# Patient Record
Sex: MALE | Race: WHITE | NOT HISPANIC OR LATINO | Employment: OTHER | URBAN - NONMETROPOLITAN AREA
[De-identification: names, ages, dates, MRNs, and addresses within clinical notes are randomized per-mention and may not be internally consistent; named-entity substitution may affect disease eponyms.]

---

## 2018-10-10 ENCOUNTER — APPOINTMENT (OUTPATIENT)
Dept: GENERAL RADIOLOGY | Facility: HOSPITAL | Age: 43
End: 2018-10-10

## 2018-10-10 ENCOUNTER — HOSPITAL ENCOUNTER (EMERGENCY)
Facility: HOSPITAL | Age: 43
Discharge: HOME | End: 2018-10-10
Attending: EMERGENCY MEDICINE | Admitting: EMERGENCY MEDICINE

## 2018-10-10 VITALS
OXYGEN SATURATION: 99 % | HEART RATE: 86 BPM | DIASTOLIC BLOOD PRESSURE: 75 MMHG | BODY MASS INDEX: 27.02 KG/M2 | WEIGHT: 193 LBS | RESPIRATION RATE: 15 BRPM | TEMPERATURE: 98.4 F | SYSTOLIC BLOOD PRESSURE: 124 MMHG | HEIGHT: 71 IN

## 2018-10-10 DIAGNOSIS — S82.892A CLOSED AVULSION FRACTURE OF LEFT ANKLE, INITIAL ENCOUNTER: Primary | ICD-10-CM

## 2018-10-10 PROCEDURE — 25010000002 KETOROLAC TROMETHAMINE PER 15 MG: Performed by: EMERGENCY MEDICINE

## 2018-10-10 PROCEDURE — 73610 X-RAY EXAM OF ANKLE: CPT

## 2018-10-10 PROCEDURE — 99283 EMERGENCY DEPT VISIT LOW MDM: CPT

## 2018-10-10 PROCEDURE — 96372 THER/PROPH/DIAG INJ SC/IM: CPT

## 2018-10-10 RX ORDER — KETOROLAC TROMETHAMINE 30 MG/ML
60 INJECTION, SOLUTION INTRAMUSCULAR; INTRAVENOUS ONCE
Status: COMPLETED | OUTPATIENT
Start: 2018-10-10 | End: 2018-10-10

## 2018-10-10 RX ORDER — HYDROCODONE BITARTRATE AND ACETAMINOPHEN 5; 325 MG/1; MG/1
1 TABLET ORAL EVERY 6 HOURS PRN
Qty: 10 TABLET | Refills: 0 | OUTPATIENT
Start: 2018-10-10 | End: 2020-05-01

## 2018-10-10 RX ADMIN — KETOROLAC TROMETHAMINE 60 MG: 60 INJECTION, SOLUTION INTRAMUSCULAR at 16:31

## 2018-10-10 NOTE — ED PROVIDER NOTES
Subjective   43 years old male presented in the ER with left ankle swelling and pain since morning.  Patient was getting out of the house, missed one step and bent his left ankle.  Since then he's having increasing pain and swelling especially on the lateral malleolus.  Patient describes this as a sharp shooting pain moderate to severe intensity, aggravated with ambulation, partially relieved with holding still and elevation, associated with increasing swelling.  No numbness tingling of the toes/foot.  No pain in the foot.  Injury anywhere else.        History provided by:  Patient      Review of Systems   Constitutional: Negative for chills and fever.   HENT: Negative for congestion.    Respiratory: Negative for chest tightness and shortness of breath.    Cardiovascular: Negative for chest pain.   Genitourinary: Negative for flank pain.   Musculoskeletal: Positive for gait problem and joint swelling.   Skin: Negative for color change.   Neurological: Negative for syncope and light-headedness.   Psychiatric/Behavioral: Negative for agitation.       History reviewed. No pertinent past medical history.    No Known Allergies    History reviewed. No pertinent surgical history.    History reviewed. No pertinent family history.    Social History     Social History   • Marital status: Single     Social History Main Topics   • Smoking status: Current Every Day Smoker     Packs/day: 1.00     Types: Cigarettes   • Alcohol use Yes      Comment: 3-4 beers daily   • Drug use: Yes     Types: Marijuana      Comment: occasional     Other Topics Concern   • Not on file           Objective   Physical Exam   Constitutional: He is oriented to person, place, and time. He appears well-developed and well-nourished.   HENT:   Head: Normocephalic and atraumatic.   Nose: Nose normal.   Mouth/Throat: Oropharynx is clear and moist.   Eyes: Conjunctivae are normal.   Neck: Normal range of motion. Neck supple.   Cardiovascular: Normal rate,  regular rhythm and normal heart sounds.    Pulmonary/Chest: Effort normal and breath sounds normal.   Musculoskeletal: He exhibits tenderness.        Left ankle: He exhibits swelling. Tenderness. Lateral malleolus tenderness found. No head of 5th metatarsal tenderness found.        Neurological: He is alert and oriented to person, place, and time.   Skin: Skin is warm and dry. Capillary refill takes less than 2 seconds.   Psychiatric: He has a normal mood and affect.   Nursing note and vitals reviewed.      Procedures           ED Course                  MDM  Number of Diagnoses or Management Options  Diagnosis management comments: Will give Toradol for pain,  He has avulsion fracture of the tip of lateral malleolus.  We'll place him in a boot, crutches and have him follow-up with ortho for evaluation       Amount and/or Complexity of Data Reviewed  Tests in the radiology section of CPT®: ordered and reviewed      Labs Reviewed - No data to display    Xr Ankle 3+ View Left    Result Date: 10/10/2018  Narrative: Procedure:  Left ankle.    Indication:  Trauma, pain   . Technique:  Three views   . Prior relevant exam:  None. There is lateral soft tissue swelling. There is a tiny bony density beneath the lateral malleolus. This represent a small chip, avulsion type injury. Left ankle is otherwise unremarkable.     Impression: CONCLUSION: Lateral soft tissue swelling. Tiny bony density beneath the lateral malleolus possibly a small avulsion, chip type injury. Otherwise unremarkable left ankle. Electronically signed by:  Thang Jean MD  10/10/2018 3:53 PM CDT Workstation: [id number]          Final diagnoses:   Closed avulsion fracture of left ankle, initial encounter            Leonel Giordano MD  10/10/18 0943

## 2018-10-10 NOTE — DISCHARGE INSTRUCTIONS
Take pain medications as needed.  No weightbearing.  Follow up with podiatry for reevaluation.  Return to ER for worsening.

## 2018-10-15 ENCOUNTER — OFFICE VISIT (OUTPATIENT)
Dept: PODIATRY | Facility: CLINIC | Age: 43
End: 2018-10-15

## 2018-10-15 VITALS
SYSTOLIC BLOOD PRESSURE: 115 MMHG | BODY MASS INDEX: 27.64 KG/M2 | OXYGEN SATURATION: 96 % | HEIGHT: 71 IN | HEART RATE: 83 BPM | WEIGHT: 197.4 LBS | DIASTOLIC BLOOD PRESSURE: 83 MMHG

## 2018-10-15 DIAGNOSIS — S93.402A SPRAIN OF LEFT ANKLE, UNSPECIFIED LIGAMENT, INITIAL ENCOUNTER: Primary | ICD-10-CM

## 2018-10-15 PROCEDURE — 99203 OFFICE O/P NEW LOW 30 MIN: CPT | Performed by: PODIATRIST

## 2018-10-15 RX ORDER — HYDROCODONE BITARTRATE AND ACETAMINOPHEN 7.5; 325 MG/1; MG/1
1 TABLET ORAL EVERY 8 HOURS PRN
Qty: 21 TABLET | Refills: 0 | OUTPATIENT
Start: 2018-10-15 | End: 2020-05-01

## 2018-10-15 NOTE — PROGRESS NOTES
Mathew Sam  1975  43 y.o. male     Patient presents today with complaint of left ankle pain after injury. He was seen at [facility] ER and was referred here. He states his pain is 2/10 today.     10/15/2018    Chief Complaint   Patient presents with   • Left Ankle - Pain       History of Present Illness    Mathew Sam is a 43 y.o.male who presents to clinic with chief complaint of left ankle pain following injury.  Patient states that last Wednesday he twisted his ankle walking down stairs in the dark.  He states that his left ankle became very swollen and bruised.  He did go to the emergency department where x-rays were taken.  Questionable fibular avulsion fracture was noted.  He was placed into a cam boot and given follow-up with podiatry.  He presents today for follow-up.  He is currently ambulating in a cam boot.  He rates his pain as a 2 out of 10.  He does state that his foot hurts worse at night and is having trouble sleeping.  He has no other pedal complaints.      History reviewed. No pertinent past medical history.      History reviewed. No pertinent surgical history.      Family History   Problem Relation Age of Onset   • Cancer Mother    • Heart disease Mother        No Known Allergies    Social History     Social History   • Marital status: Single     Spouse name: N/A   • Number of children: N/A   • Years of education: N/A     Occupational History   • Not on file.     Social History Main Topics   • Smoking status: Current Every Day Smoker     Packs/day: 1.00     Types: Cigarettes   • Smokeless tobacco: Not on file   • Alcohol use Yes      Comment: 3-4 beers daily   • Drug use: Yes     Types: Marijuana      Comment: occasional   • Sexual activity: Not on file     Other Topics Concern   • Not on file     Social History Narrative   • No narrative on file         Current Outpatient Prescriptions   Medication Sig Dispense Refill   • diclofenac (VOLTAREN) 50 MG EC tablet Take 1 tablet by  "mouth 2 (Two) Times a Day. 60 tablet 0   • HYDROcodone-acetaminophen (NORCO) 5-325 MG per tablet Take 1 tablet by mouth Every 6 (Six) Hours As Needed for Moderate Pain  for up to 10 doses. 10 tablet 0   • HYDROcodone-acetaminophen (NORCO) 7.5-325 MG per tablet Take 1 tablet by mouth Every 8 (Eight) Hours As Needed for Moderate Pain . 21 tablet 0     No current facility-administered medications for this visit.        Review of Systems   Constitutional: Negative.    HENT: Negative.    Eyes: Negative.    Respiratory: Negative.    Cardiovascular: Negative.    Gastrointestinal: Negative.    Musculoskeletal:        Left ankle pain and swelling   Skin: Negative.    Psychiatric/Behavioral: Negative.          OBJECTIVE    /83   Pulse 83   Ht 180.3 cm (70.98\")   Wt 89.5 kg (197 lb 6.4 oz)   SpO2 96%   BMI 27.54 kg/m²       Physical Exam   Constitutional: He is oriented to person, place, and time. He appears well-developed and well-nourished.   HENT:   Head: Normocephalic and atraumatic.   Nose: Nose normal.   Eyes: Pupils are equal, round, and reactive to light. Conjunctivae and EOM are normal.   Pulmonary/Chest: Effort normal. No respiratory distress. He has no wheezes.   Neurological: He is alert and oriented to person, place, and time. He displays normal reflexes.   Skin: He is not diaphoretic.   Psychiatric: He has a normal mood and affect. His behavior is normal.   Vitals reviewed.      Gait: antalgic    Assistive Device: cam boot    Left Lower Extremity    Cardiovascular:    DP/PT pulses palpable    CFT brisk  to all digits  Skin temp is warm to warm from proximal tibia to distal digits      Musculoskeletal:  Muscle strength deferred  ROM of the 1st MTP is full without pain or crepitus   Negative pain with anterior drawer test  Negative pain with talar tilt test  POP to ATFL and CFL  Ankle ROM WNL    Dermatological:   Skin is warm, dry and intact   Webspaces 1-4 are clean, dry and intact.   No subcutaneous " nodules or masses noted    No open wounds noted     Neurological:   Protective sensation intact   Sensation intact to light touch        Procedures        ASSESSMENT AND PLAN    Mathew was seen today for pain.    Diagnoses and all orders for this visit:    Sprain of left ankle, unspecified ligament, initial encounter    Other orders  -     diclofenac (VOLTAREN) 50 MG EC tablet; Take 1 tablet by mouth 2 (Two) Times a Day.  -     HYDROcodone-acetaminophen (NORCO) 7.5-325 MG per tablet; Take 1 tablet by mouth Every 8 (Eight) Hours As Needed for Moderate Pain .      - Comprehensive foot and ankle exam performed.   - X-rays reviewed taken in the emergency department  - WBAT in cam boot only for now. Dispensed tubagrip stocking   - Ice and elevation at home  - Rx for diclofenac and Norco 7.5  - All questions were answered to the patients satisfaction.  - RTC 2 weeks            This document has been electronically signed by Carson Altman DPM on October 15, 2018 12:44 PM     10/15/2018  12:44 PM

## 2018-10-29 ENCOUNTER — OFFICE VISIT (OUTPATIENT)
Dept: PODIATRY | Facility: CLINIC | Age: 43
End: 2018-10-29

## 2018-10-29 VITALS — OXYGEN SATURATION: 98 % | WEIGHT: 197.31 LBS | HEART RATE: 96 BPM | BODY MASS INDEX: 27.62 KG/M2 | HEIGHT: 71 IN

## 2018-10-29 DIAGNOSIS — S93.402D SPRAIN OF LEFT ANKLE, UNSPECIFIED LIGAMENT, SUBSEQUENT ENCOUNTER: Primary | ICD-10-CM

## 2018-10-29 PROCEDURE — 99213 OFFICE O/P EST LOW 20 MIN: CPT | Performed by: PODIATRIST

## 2018-10-29 NOTE — PATIENT INSTRUCTIONS
Ankle Sprain, Phase II Rehab  Ask your health care provider which exercises are safe for you. Do exercises exactly as told by your health care provider and adjust them as directed. It is normal to feel mild stretching, pulling, tightness, or discomfort as you do these exercises, but you should stop right away if you feel sudden pain or your pain gets worse. Do not begin these exercises until told by your health care provider.  Stretching and range of motion exercises  These exercises warm up your muscles and joints and improve the movement and flexibility of your lower leg and ankle. These exercises also help to relieve pain and stiffness.  Exercise A: Gastroc stretch, standing    1. Stand with your hands against a wall.  2. Extend your left / right leg behind you, and bend your front knee slightly. Your heels should be on the floor.  3. Keeping your heels on the floor and your back knee straight, shift your weight toward the wall. You should feel a gentle stretch in the back of your lower leg (calf).  4. Hold this position for __________ seconds.  Repeat __________ times. Complete this exercise __________ times a day.  Exercise B: Soleus stretch, standing  1. Stand with your hands against a wall.  2. Extend your left / right leg behind you, and bend your front knee slightly. Both of your heels should be on the floor.  3. Keeping your heels on the floor, bend your back knee and shift your weight slightly over your back leg. You should feel a gentle stretch deep in your calf.  4. Hold this position for __________ seconds.  Repeat __________ times. Complete this exercise __________ times a day.  Strengthening exercises  These exercises build strength and endurance in your lower leg. Endurance is the ability to use your muscles for a long time, even after they get tired.  Exercise C: Heel walking (  dorsiflexion)  Walk on your heels for __________ seconds or ___________ ft. Keep your toes as high as possible.  Repeat  __________ times. Complete this exercise __________ times a day.  Balance exercises  These exercises improve your balance and the reaction and control of your ankle to help improve stability.  Exercise D: Multi-angle lunge  1. Stand with your feet together.  2. Take a step forward with your left / right leg, and shift your weight onto that leg. Your back heel will come off the floor, and your back toes will stay in place.  3. Push off your front leg to return your front foot to the starting position next to your other foot.  4. Repeat to the side, to the back, and any other directions as told by your health care provider.  Repeat in each direction __________ times. Complete this exercise __________ times a day.  Exercise E: Single leg stand  1. Without shoes, stand near a railing or in a door frame. Hold onto the railing or door frame as needed.  2. Stand on your left / right foot. Keep your big toe down on the floor and try to keep your arch lifted.  3. Hold this position for __________ seconds.  Repeat __________ times. Complete this exercise __________ times a day.  If this exercise is too easy, you can try it with your eyes closed or while standing on a pillow.  Exercise F: Inversion/eversion    You will need a balance board for this exercise. Ask your health care provider where you can get a balance board or how you can make one.  1. Stand on a non-carpeted surface near a countertop or wall.  2. Step onto the balance board so your feet are hip-width apart.  3. Keep your feet in place and keep your upper body and hips steady. Using only your feet and ankles to move the board, do one or both of the following exercises as told by your health care provider:  ? Tip the board side to side as far as you can, alternating between tipping to the left and tipping to the right. If you can, tip the board so it silently taps the floor. Do not let the board forcefully hit the floor. From time to time, pause to hold a steady  position.  ? Tip the board side to side so the board does not hit the floor at all. From time to time, pause to hold a steady position.  Repeat the movement for each exercise __________ times. Complete each exercise __________ times a day.  Exercise G: Plantar flexion/dorsiflexion    You will need a balance board for this exercise. Ask your health care provider where you can get a balance board or how you can make one.  1. Stand on a non-carpeted surface near a countertop or wall.  2. Step onto the balance board so your feet are hip-width apart.  3. Keep your feet in place and keep your upper body and hips steady. Using only your feet and ankles to move the board, do one or both of the following exercises as told by your health care provider:  ? Tip the board forward and backward so the board silently taps the floor. Do not let the board forcefully hit the floor. From time to time, pause to hold a steady position.  ? Tip the board forward and backward so the board does not hit the floor at all. From time to time, pause to hold a steady position.  Repeat the movement for each exercise __________ times. Complete each exercise __________ times a day.  This information is not intended to replace advice given to you by your health care provider. Make sure you discuss any questions you have with your health care provider.  Document Released: 04/09/2007 Document Revised: 08/24/2017 Document Reviewed: 10/31/2016  [employer] Interactive Patient Education © 2018 [employer]  Ankle Sprain, Phase I Rehab  Ask your health care provider which exercises are safe for you. Do exercises exactly as told by your health care provider and adjust them as directed. It is normal to feel mild stretching, pulling, tightness, or discomfort as you do these exercises, but you should stop right away if you feel sudden pain or your pain gets worse. Do not begin these exercises until told by your health care provider.  Stretching and range of  motion exercises  These exercises warm up your muscles and joints and improve the movement and flexibility of your lower leg and ankle. These exercises also help to relieve pain and stiffness.  Exercise A: Gastroc and soleus stretch    1. Sit on the floor with your left / right leg extended.  2. Loop a belt or towel around the ball of your left / right foot. The ball of your foot is on the walking surface, right under your toes.  3. Keep your left / right ankle and foot relaxed and keep your knee straight while you use the belt or towel to pull your foot toward you. You should feel a gentle stretch behind your calf or knee.  4. Hold this position for __________ seconds, then release to the starting position.  Repeat the exercise with your knee bent. You can put a pillow or a rolled bath towel under your knee to support it. You should feel a stretch deep in your calf or at your Achilles tendon.  Repeat each stretch __________ times. Complete these stretches __________ times a day.  Exercise B: Ankle alphabet    1. Sit with your left / right leg supported at the lower leg.  ? Do not rest your foot on anything.  ? Make sure your foot has room to move freely.  2. Think of your left / right foot as a paintbrush, and move your foot to trace each letter of the alphabet in the air. Keep your hip and knee still while you trace. Make the letters as large as you can without feeling discomfort.  3. Trace every letter from A to Z.  Repeat __________ times. Complete this exercise __________ times a day.  Strengthening exercises  These exercises build strength and endurance in your ankle and lower leg. Endurance is the ability to use your muscles for a long time, even after they get tired.  Exercise C: Dorsiflexors    1. Secure a rubber exercise band or tube to an object, such as a table leg, that will stay still when the band is pulled. Secure the other end around your left / right foot.  2. Sit on the floor facing the object,  with your left / right leg extended. The band or tube should be slightly tense when your foot is relaxed.  3. Slowly bring your foot toward you, pulling the band tighter.  4. Hold this position for __________ seconds.  5. Slowly return your foot to the starting position.  Repeat __________ times. Complete this exercise __________ times a day.  Exercise D: Plantar flexors    1. Sit on the floor with your left / right leg extended.  2. Loop a rubber exercise tube or band around the ball of your left / right foot. The ball of your foot is on the walking surface, right under your toes.  ? Hold the ends of the band or tube in your hands.  ? The band or tube should be slightly tense when your foot is relaxed.  3. Slowly point your foot and toes downward, pushing them away from you.  4. Hold this position for __________ seconds.  5. Slowly return your foot to the starting position.  Repeat __________ times. Complete this exercise __________ times a day.  Exercise E: Evertors  1. Sit on the floor with your legs straight out in front of you.  2. Loop a rubber exercise band or tube around the ball of your left / right foot. The ball of your foot is on the walking surface, right under your toes.  ? Hold the ends of the band in your hands, or secure the band to a stable object.  ? The band or tube should be slightly tense when your foot is relaxed.  3. Slowly push your foot outward, away from your other leg.  4. Hold this position for __________ seconds.  5. Slowly return your foot to the starting position.  Repeat __________ times. Complete this exercise __________ times a day.  This information is not intended to replace advice given to you by your health care provider. Make sure you discuss any questions you have with your health care provider.  Document Released: 07/19/2006 Document Revised: 08/24/2017 Document Reviewed: 10/31/2016  [employer] Interactive Patient Education © 2018 [employer]

## 2018-10-29 NOTE — PROGRESS NOTES
Mathew Angulo Sam  1975  43 y.o. male     Patient presents today for follow up of left ankle pain after an injury. He states he is still having a lot of pain in the ankle and it still stays swollen some. He gave a pain scale of 3/10 today.         Chief Complaint   Patient presents with   • Left Ankle - Follow-up       History of Present Illness    Patient presents to clinic today for follow-up of his left ankle sprain.  He is currently weightbearing as tolerated in a cam boot.  He states that his pain is improving.  His swelling has also decreased.  He currently rates his pain as a 3 out of 10.  He denies any new pedal complaints.      History reviewed. No pertinent past medical history.      History reviewed. No pertinent surgical history.      Family History   Problem Relation Age of Onset   • Cancer Mother    • Heart disease Mother        No Known Allergies    Social History     Social History   • Marital status: Single     Spouse name: N/A   • Number of children: N/A   • Years of education: N/A     Occupational History   • Not on file.     Social History Main Topics   • Smoking status: Current Every Day Smoker     Packs/day: 1.00     Types: Cigarettes   • Smokeless tobacco: Not on file   • Alcohol use Yes      Comment: 3-4 beers daily   • Drug use: Yes     Types: Marijuana      Comment: occasional   • Sexual activity: Not on file     Other Topics Concern   • Not on file     Social History Narrative   • No narrative on file         Current Outpatient Prescriptions   Medication Sig Dispense Refill   • diclofenac (VOLTAREN) 50 MG EC tablet Take 1 tablet by mouth 2 (Two) Times a Day. 60 tablet 1   • HYDROcodone-acetaminophen (NORCO) 5-325 MG per tablet Take 1 tablet by mouth Every 6 (Six) Hours As Needed for Moderate Pain  for up to 10 doses. 10 tablet 0   • HYDROcodone-acetaminophen (NORCO) 7.5-325 MG per tablet Take 1 tablet by mouth Every 8 (Eight) Hours As Needed for Moderate Pain . 21 tablet 0     No  "current facility-administered medications for this visit.        Review of Systems   Constitutional: Negative.    HENT: Negative.    Eyes: Negative.    Cardiovascular: Negative.    Gastrointestinal: Negative.    Musculoskeletal:        Left ankle pain and swelling   Skin: Negative.    Psychiatric/Behavioral: Negative.          OBJECTIVE    Pulse 96   Ht 180.3 cm (70.98\")   Wt 89.5 kg (197 lb 5 oz)   SpO2 98%   BMI 27.53 kg/m²       Physical Exam   Constitutional: He is oriented to person, place, and time. He appears well-developed and well-nourished.   HENT:   Head: Normocephalic and atraumatic.   Nose: Nose normal.   Eyes: Pupils are equal, round, and reactive to light. Conjunctivae and EOM are normal.   Pulmonary/Chest: Effort normal. No respiratory distress. He has no wheezes.   Neurological: He is alert and oriented to person, place, and time. He displays normal reflexes.   Skin: He is not diaphoretic.   Psychiatric: He has a normal mood and affect. His behavior is normal.       Gait: antalgic    Assistive Device: cam boot    Left Lower Extremity    Cardiovascular:    DP/PT pulses palpable    CFT brisk  to all digits  Skin temp is warm to warm from proximal tibia to distal digits      Musculoskeletal:  Muscle strength normal  ROM of the 1st MTP is full without pain or crepitus   Negative pain with anterior drawer test  Negative pain with talar tilt test  POP to ATFL and CFL  Ankle ROM WNL    Dermatological:   Skin is warm, dry and intact   Webspaces 1-4 are clean, dry and intact.   No subcutaneous nodules or masses noted    No open wounds noted     Neurological:   Protective sensation intact   Sensation intact to light touch        Procedures        ASSESSMENT AND PLAN    Mathew was seen today for follow-up.    Diagnoses and all orders for this visit:    Sprain of left ankle, unspecified ligament, subsequent encounter  -     XR Ankle 3+ View Left    Other orders  -     diclofenac (VOLTAREN) 50 MG EC tablet; " Take 1 tablet by mouth 2 (Two) Times a Day.      - Patient is improving  -X-rays taken and reviewed  - Refilled anti-inflammatory medication  - Dispensed lace up ankle brace and therapy band  - Patient to start at home physical therapy exercises   - All questions were answered to the patients satisfaction.  - RTC 2 weeks            This document has been electronically signed by Carson Altman DPM on October 29, 2018 12:36 PM     10/29/2018  12:36 PM

## 2018-11-05 ENCOUNTER — PREP FOR SURGERY (OUTPATIENT)
Dept: OTHER | Facility: HOSPITAL | Age: 43
End: 2018-11-05

## 2020-05-01 PROCEDURE — U0002 COVID-19 LAB TEST NON-CDC: HCPCS | Performed by: FAMILY MEDICINE

## 2020-11-23 NOTE — ED TRIAGE NOTES
Pt missed a step this morning around 0530 this morning and injured his left ankle. Swelling noted to lateral aspect of left ankle. Ice applied at triage.   Vocal cord dysfunction

## 2021-06-05 ENCOUNTER — HOSPITAL ENCOUNTER (EMERGENCY)
Facility: HOSPITAL | Age: 46
Discharge: HOME | End: 2021-06-05
Attending: EMERGENCY MEDICINE | Admitting: EMERGENCY MEDICINE

## 2021-06-05 VITALS
HEART RATE: 123 BPM | RESPIRATION RATE: 20 BRPM | SYSTOLIC BLOOD PRESSURE: 126 MMHG | BODY MASS INDEX: 26.6 KG/M2 | TEMPERATURE: 97 F | DIASTOLIC BLOOD PRESSURE: 96 MMHG | WEIGHT: 190 LBS | HEIGHT: 71 IN | OXYGEN SATURATION: 95 %

## 2021-06-05 DIAGNOSIS — IMO0001 ALCOHOLISM /ALCOHOL ABUSE: Primary | ICD-10-CM

## 2021-06-05 PROCEDURE — 93005 ELECTROCARDIOGRAM TRACING: CPT | Performed by: EMERGENCY MEDICINE

## 2021-06-05 PROCEDURE — 93010 ELECTROCARDIOGRAM REPORT: CPT | Performed by: INTERNAL MEDICINE

## 2021-06-05 PROCEDURE — 99282 EMERGENCY DEPT VISIT SF MDM: CPT

## 2021-06-05 RX ORDER — CHLORDIAZEPOXIDE HYDROCHLORIDE 25 MG/1
25 CAPSULE, GELATIN COATED ORAL 4 TIMES DAILY PRN
Qty: 16 CAPSULE | Refills: 0 | OUTPATIENT
Start: 2021-06-05 | End: 2021-06-22

## 2021-06-06 LAB
QT INTERVAL: 312 MS
QTC INTERVAL: 425 MS

## 2021-06-06 NOTE — ED NOTES
Patient denies consuming any alcohol today, but does state that he had a pint of santos yesterday.     Brenda Barton, RN  06/05/21 2183

## 2021-06-06 NOTE — ED PROVIDER NOTES
Subjective   Patient presents emergency department with complaint of substance abuse though not today.  Patient states that he has a problem with alcoholism.  Patient states he is quit several times who normally drinks every day.  He states he quit as far as 72 hours even this week.  Patient does get some shakes though has not had any seizures or significant DTs per his account.  Patient does not appear intoxicated at this time.  Patient states he just would like his daughter in his life again, and this is not possible with his current drinking.  Patient is looking at his options in terms of rehab.          Review of Systems   Constitutional: Negative.  Negative for appetite change, chills and fever.   HENT: Negative.  Negative for congestion.    Eyes: Negative.  Negative for photophobia and visual disturbance.   Respiratory: Negative.  Negative for cough, chest tightness and shortness of breath.    Cardiovascular: Negative.  Negative for chest pain and palpitations.   Gastrointestinal: Negative.  Negative for abdominal pain, constipation, diarrhea, nausea and vomiting.   Endocrine: Negative.    Genitourinary: Negative.  Negative for decreased urine volume, dysuria, flank pain and hematuria.   Musculoskeletal: Negative.  Negative for arthralgias, back pain, myalgias, neck pain and neck stiffness.   Skin: Negative.  Negative for pallor.   Neurological: Negative.  Negative for dizziness, syncope, weakness, light-headedness, numbness and headaches.   Psychiatric/Behavioral: Negative.  Negative for agitation, behavioral problems, confusion, decreased concentration, dysphoric mood, hallucinations and suicidal ideas. The patient is not nervous/anxious and is not hyperactive.    All other systems reviewed and are negative.      History reviewed. No pertinent past medical history.    No Known Allergies    History reviewed. No pertinent surgical history.    Family History   Problem Relation Age of Onset   • Cancer Mother     • Heart disease Mother        Social History     Socioeconomic History   • Marital status: Single     Spouse name: Not on file   • Number of children: Not on file   • Years of education: Not on file   • Highest education level: Not on file   Tobacco Use   • Smoking status: Current Every Day Smoker     Packs/day: 1.00     Types: Cigarettes   Substance and Sexual Activity   • Alcohol use: Yes     Comment: 3-4 beers daily   • Drug use: Yes     Types: Marijuana     Comment: occasional           Objective   Physical Exam  Vitals and nursing note reviewed.   Constitutional:       General: He is not in acute distress.     Appearance: Normal appearance. He is well-developed. He is not ill-appearing or toxic-appearing.   HENT:      Head: Normocephalic and atraumatic.      Mouth/Throat:      Mouth: Mucous membranes are moist.   Eyes:      Extraocular Movements: Extraocular movements intact.      Conjunctiva/sclera: Conjunctivae normal.      Pupils: Pupils are equal, round, and reactive to light.   Neck:      Vascular: No JVD.   Cardiovascular:      Rate and Rhythm: Regular rhythm. Tachycardia present.      Heart sounds: Normal heart sounds. No murmur heard.   No friction rub. No gallop.    Pulmonary:      Effort: Pulmonary effort is normal. No respiratory distress.      Breath sounds: No wheezing or rales.   Chest:      Chest wall: No tenderness.   Abdominal:      General: Bowel sounds are normal. There is no distension.      Palpations: Abdomen is soft. There is no mass.      Tenderness: There is no abdominal tenderness. There is no guarding or rebound.   Musculoskeletal:         General: Normal range of motion.      Cervical back: Normal range of motion and neck supple.   Skin:     General: Skin is warm and dry.      Capillary Refill: Capillary refill takes less than 2 seconds.   Neurological:      General: No focal deficit present.      Mental Status: He is alert and oriented to person, place, and time.   Psychiatric:          Behavior: Behavior normal.         Thought Content: Thought content normal.         Judgment: Judgment normal.         Procedures           ED Course  ED Course as of Jun 05 2228   Sat Jun 05, 2021 2154 Patient was seen and evaluated by our , Sakshi, who gave him counseling in the local resources for substance abuse.  Patient will call these resources and will be given a Librium course to help with his withdrawal symptoms.  Patient will be discharged outpatient follow-up.    [PC]   2154 EKG notes sinus tachycardia.  Regular rhythm.    [PC]      ED Course User Index  [PC] Flavio Kunz MD                                           [facility]    Final diagnoses:   Alcoholism /alcohol abuse (CMS/HCC)       ED Disposition  ED Disposition     ED Disposition Condition Comment    Discharge Stable           [facility]  [address] [place] [address] [phone]  Call   For further evaluation and management         Medication List      New Prescriptions    chlordiazePOXIDE 25 MG capsule  Commonly known as: LIBRIUM  Take 1 capsule by mouth 4 (Four) Times a Day As Needed for Anxiety or Withdrawal.           Where to Get Your Medications      These medications were sent to [facility] [place] AT [place] [address] - [phone]  - [phone] [address] [place], [facility] [address]    Phone: [phone]   · chlordiazePOXIDE 25 MG capsule          Flavio Kunz MD  06/05/21 8071

## 2021-06-06 NOTE — DISCHARGE INSTRUCTIONS
Use the alcohol/substance abuse packet resources for further help.  Start librium to help with withdrawal symptoms.  Do not drink alcohol with the librium as this can cause death.  Return with any new or worsening symptoms, or any concerns.

## 2021-06-06 NOTE — CASE MANAGEMENT/SOCIAL WORK
"Pt is here for rehab information per the er track board. I took pt detox/rehab packet to er room 15. He asked for his \"wife\" to be contacted upstairs to come and secure his personal belongings. His ex-wife Wali had already called and donny/issac Matthew RN er clinical leader that her ex  is in the er and she will come to er if he needs her for anything. I called her at the request of pt and she will come get his belongings. I d/w Tiff HERRERA  "

## 2021-11-12 DIAGNOSIS — M25.511 ACUTE PAIN OF RIGHT SHOULDER: Primary | ICD-10-CM

## 2021-11-15 ENCOUNTER — OFFICE VISIT (OUTPATIENT)
Dept: ORTHOPEDIC SURGERY | Facility: CLINIC | Age: 46
End: 2021-11-15

## 2021-11-15 VITALS — WEIGHT: 192 LBS | HEIGHT: 71 IN | BODY MASS INDEX: 26.88 KG/M2

## 2021-11-15 DIAGNOSIS — M25.511 CHRONIC RIGHT SHOULDER PAIN: Primary | ICD-10-CM

## 2021-11-15 DIAGNOSIS — M75.41 IMPINGEMENT SYNDROME OF RIGHT SHOULDER: ICD-10-CM

## 2021-11-15 DIAGNOSIS — G89.29 CHRONIC RIGHT SHOULDER PAIN: Primary | ICD-10-CM

## 2021-11-15 DIAGNOSIS — M67.911 ROTATOR CUFF DYSFUNCTION, RIGHT: ICD-10-CM

## 2021-11-15 PROCEDURE — 20610 DRAIN/INJ JOINT/BURSA W/O US: CPT | Performed by: NURSE PRACTITIONER

## 2021-11-15 PROCEDURE — 99214 OFFICE O/P EST MOD 30 MIN: CPT | Performed by: NURSE PRACTITIONER

## 2021-11-15 RX ORDER — LIDOCAINE HYDROCHLORIDE 10 MG/ML
2 INJECTION, SOLUTION INFILTRATION; PERINEURAL
Status: COMPLETED | OUTPATIENT
Start: 2021-11-15 | End: 2021-11-15

## 2021-11-15 RX ORDER — MELOXICAM 15 MG/1
15 TABLET ORAL DAILY
Qty: 30 TABLET | Refills: 1 | Status: SHIPPED | OUTPATIENT
Start: 2021-11-15 | End: 2022-03-22 | Stop reason: SDUPTHER

## 2021-11-15 RX ORDER — TRIAMCINOLONE ACETONIDE 40 MG/ML
40 INJECTION, SUSPENSION INTRA-ARTICULAR; INTRAMUSCULAR
Status: COMPLETED | OUTPATIENT
Start: 2021-11-15 | End: 2021-11-15

## 2021-11-15 RX ADMIN — LIDOCAINE HYDROCHLORIDE 2 ML: 10 INJECTION, SOLUTION INFILTRATION; PERINEURAL at 09:12

## 2021-11-15 RX ADMIN — TRIAMCINOLONE ACETONIDE 40 MG: 40 INJECTION, SUSPENSION INTRA-ARTICULAR; INTRAMUSCULAR at 09:12

## 2021-11-15 NOTE — PROGRESS NOTES
Mathew Sam is a 46 y.o. male   Primary provider:  Provider, No Known       Chief Complaint   Patient presents with   • Right Shoulder - Pain       HISTORY OF PRESENT ILLNESS:    Patient is a 46-year-old male who presents today with complaints of right shoulder pain that has been present for the past 4 to 5 years.  Recently he has noticed pain in his bicep and in his neck as well.  He reports the pain is moderate and constant.  Pain is stabbing and aching in quality.  Pain is associated with clicking and popping.  Overhead activity is particularly painful.  Patient states he is relying on his neck muscles to help him get things out of cabinets.  He has tried ice and heat and rest without significant relief in symptoms.  He is sent for x-rays today.    Pain  This is a chronic problem. The current episode started more than 1 year ago. The problem occurs constantly. The problem has been gradually worsening. Associated symptoms include arthralgias, myalgias and numbness. The symptoms are aggravated by exertion. He has tried ice, heat and rest for the symptoms.        CONCURRENT MEDICAL HISTORY:    History reviewed. No pertinent past medical history.    No Known Allergies      Current Outpatient Medications:   •  meloxicam (Mobic) 15 MG tablet, Take 1 tablet by mouth Daily., Disp: 30 tablet, Rfl: 1    No past surgical history on file.    Family History   Problem Relation Age of Onset   • Cancer Mother    • Heart disease Mother         Social History     Socioeconomic History   • Marital status:    Tobacco Use   • Smoking status: Current Every Day Smoker     Packs/day: 1.00     Types: Cigarettes   Substance and Sexual Activity   • Alcohol use: Yes     Comment: 3-4 beers daily   • Drug use: Yes     Types: Marijuana     Comment: occasional        Review of Systems   Constitutional: Negative.    HENT: Negative.    Eyes: Negative.    Respiratory: Negative.    Cardiovascular: Negative.    Gastrointestinal:  "Negative.    Endocrine: Negative.    Genitourinary: Negative.    Musculoskeletal: Positive for arthralgias and myalgias.        Right shoulder pain   Skin: Negative.    Allergic/Immunologic: Negative.    Neurological: Positive for numbness.        Tingling   Hematological: Negative.    Psychiatric/Behavioral: Positive for sleep disturbance.       PHYSICAL EXAMINATION:       Ht 180.3 cm (71\")   Wt 87.1 kg (192 lb)   BMI 26.78 kg/m²     Physical Exam  Vitals and nursing note reviewed.   Constitutional:       General: He is not in acute distress.     Appearance: He is well-developed. He is not toxic-appearing.   HENT:      Head: Normocephalic.   Pulmonary:      Effort: Pulmonary effort is normal. No respiratory distress.   Skin:     General: Skin is warm and dry.   Neurological:      Mental Status: He is alert and oriented to person, place, and time.   Psychiatric:         Behavior: Behavior normal.         Thought Content: Thought content normal.         Judgment: Judgment normal.         GAIT:     []  Normal  []  Antalgic    Assistive device: []  None  []  Walker     []  Crutches  []  Cane     []  Wheelchair  []  Stretcher    Left Shoulder Exam     Tenderness   The patient is experiencing tenderness in the acromion and acromioclavicular joint.    Range of Motion   Active abduction: 110 (Pain 70)   Extension: normal   External rotation: 80   Forward flexion: 140 (Pain at 90)   Internal rotation 90 degrees: 90     Tests   Wang test: positive  Cross arm: negative  Impingement: positive    Other   Erythema: absent  Sensation: normal  Pulse: present     Comments:  Positive full can test  Positive empty can test  Negative liftoff test              XR Shoulder 2+ View Right    Result Date: 11/15/2021  Narrative: Study: XR shoulder 2+ view, right Comparison: None Narrative: Glenohumeral joint space is well-maintained.  Mild AC joint arthrosis.  Alignment and position of right shoulder are acceptable.  No evidence of " fracture or dislocation.  Stacy Padilla APRN 11/15/2021           ASSESSMENT:    Diagnoses and all orders for this visit:    Chronic right shoulder pain  -     Ambulatory Referral to Physical Therapy Evaluate and treat; (as indicated ); Stretching, ROM, Strengthening    Rotator cuff dysfunction, right    Impingement syndrome of right shoulder    Other orders  -     meloxicam (Mobic) 15 MG tablet; Take 1 tablet by mouth Daily.  -     Cancel: Large Joint Arthrocentesis  -     Large Joint Arthrocentesis: R subacromial bursa          PLAN    Large Joint Arthrocentesis: R subacromial bursa  Date/Time: 11/15/2021 9:12 AM  Consent given by: patient  Site marked: site marked  Timeout: Immediately prior to procedure a time out was called to verify the correct patient, procedure, equipment, support staff and site/side marked as required   Supporting Documentation  Indications: pain   Procedure Details  Location: shoulder - R subacromial bursa  Preparation: Patient was prepped and draped in the usual sterile fashion  Needle size: 22 G  Approach: posterior  Medications administered: 40 mg triamcinolone acetonide 40 MG/ML; 2 mL lidocaine 1 %  Patient tolerance: patient tolerated the procedure well with no immediate complications          Pain elicited with special testing of rotator cuff and with range of motion.  Discussed treatment of rotator cuff tendinitis and impingement syndrome.  After discussing risk, benefits, alternatives patient desires to proceed with subacromial injection, Mobic, physical therapy for 1-2 visits.  [facility] ordered sent to [facility].  Patient tolerated injection well.  Patient started on Mobic.  Patient to return in 4 to 6 weeks for recheck, if not feeling significantly better plan for MRI.      Return in about 4 weeks (around 12/13/2021), or if symptoms worsen or fail to improve.    Stacy Padilla, APRN

## 2022-03-22 ENCOUNTER — LAB (OUTPATIENT)
Dept: LAB | Facility: HOSPITAL | Age: 47
End: 2022-03-22

## 2022-03-22 ENCOUNTER — OFFICE VISIT (OUTPATIENT)
Dept: FAMILY MEDICINE CLINIC | Facility: CLINIC | Age: 47
End: 2022-03-22

## 2022-03-22 VITALS
TEMPERATURE: 96.9 F | SYSTOLIC BLOOD PRESSURE: 132 MMHG | RESPIRATION RATE: 20 BRPM | BODY MASS INDEX: 27.15 KG/M2 | HEIGHT: 71 IN | OXYGEN SATURATION: 97 % | HEART RATE: 94 BPM | DIASTOLIC BLOOD PRESSURE: 82 MMHG | WEIGHT: 193.9 LBS

## 2022-03-22 DIAGNOSIS — K21.9 GASTROESOPHAGEAL REFLUX DISEASE WITHOUT ESOPHAGITIS: ICD-10-CM

## 2022-03-22 DIAGNOSIS — Z11.59 NEED FOR HEPATITIS C SCREENING TEST: ICD-10-CM

## 2022-03-22 DIAGNOSIS — E55.9 VITAMIN D DEFICIENCY: ICD-10-CM

## 2022-03-22 DIAGNOSIS — Z76.89 ENCOUNTER TO ESTABLISH CARE: Primary | ICD-10-CM

## 2022-03-22 DIAGNOSIS — Z00.00 ANNUAL PHYSICAL EXAM: ICD-10-CM

## 2022-03-22 DIAGNOSIS — F33.0 MILD EPISODE OF RECURRENT MAJOR DEPRESSIVE DISORDER: ICD-10-CM

## 2022-03-22 DIAGNOSIS — Z12.5 PROSTATE CANCER SCREENING: ICD-10-CM

## 2022-03-22 DIAGNOSIS — E78.2 MIXED HYPERLIPIDEMIA: ICD-10-CM

## 2022-03-22 DIAGNOSIS — R20.0 NUMBNESS OF LEFT THUMB: ICD-10-CM

## 2022-03-22 DIAGNOSIS — M75.41 IMPINGEMENT SYNDROME OF RIGHT SHOULDER: ICD-10-CM

## 2022-03-22 DIAGNOSIS — F41.9 ANXIETY: ICD-10-CM

## 2022-03-22 DIAGNOSIS — G56.02 COMPRESSION OF LEFT MEDIAN NERVE AT ELBOW: ICD-10-CM

## 2022-03-22 LAB
25(OH)D3 SERPL-MCNC: 18.1 NG/ML (ref 30–100)
ALBUMIN SERPL-MCNC: 5 G/DL (ref 3.5–5.2)
ALBUMIN/GLOB SERPL: 1.9 G/DL
ALP SERPL-CCNC: 59 U/L (ref 39–117)
ALT SERPL W P-5'-P-CCNC: 17 U/L (ref 1–41)
ANION GAP SERPL CALCULATED.3IONS-SCNC: 9.6 MMOL/L (ref 5–15)
AST SERPL-CCNC: 12 U/L (ref 1–40)
BASOPHILS # BLD AUTO: 0.07 10*3/MM3 (ref 0–0.2)
BASOPHILS NFR BLD AUTO: 1.2 % (ref 0–1.5)
BILIRUB SERPL-MCNC: 0.4 MG/DL (ref 0–1.2)
BUN SERPL-MCNC: 18 MG/DL (ref 6–20)
BUN/CREAT SERPL: 19.1 (ref 7–25)
CALCIUM SPEC-SCNC: 9.9 MG/DL (ref 8.6–10.5)
CHLORIDE SERPL-SCNC: 104 MMOL/L (ref 98–107)
CHOLEST SERPL-MCNC: 242 MG/DL (ref 0–200)
CO2 SERPL-SCNC: 27.4 MMOL/L (ref 22–29)
CREAT SERPL-MCNC: 0.94 MG/DL (ref 0.76–1.27)
DEPRECATED RDW RBC AUTO: 48.3 FL (ref 37–54)
EGFRCR SERPLBLD CKD-EPI 2021: 100.6 ML/MIN/1.73
EOSINOPHIL # BLD AUTO: 0.21 10*3/MM3 (ref 0–0.4)
EOSINOPHIL NFR BLD AUTO: 3.7 % (ref 0.3–6.2)
ERYTHROCYTE [DISTWIDTH] IN BLOOD BY AUTOMATED COUNT: 13.7 % (ref 12.3–15.4)
GLOBULIN UR ELPH-MCNC: 2.6 GM/DL
GLUCOSE SERPL-MCNC: 92 MG/DL (ref 65–99)
HBA1C MFR BLD: 5.4 % (ref 4.8–5.6)
HCT VFR BLD AUTO: 43.8 % (ref 37.5–51)
HCV AB SER DONR QL: NORMAL
HDLC SERPL-MCNC: 85 MG/DL (ref 40–60)
HGB BLD-MCNC: 15 G/DL (ref 13–17.7)
IMM GRANULOCYTES # BLD AUTO: 0.01 10*3/MM3 (ref 0–0.05)
IMM GRANULOCYTES NFR BLD AUTO: 0.2 % (ref 0–0.5)
LDLC SERPL CALC-MCNC: 151 MG/DL (ref 0–100)
LDLC/HDLC SERPL: 1.75 {RATIO}
LYMPHOCYTES # BLD AUTO: 2.14 10*3/MM3 (ref 0.7–3.1)
LYMPHOCYTES NFR BLD AUTO: 37.8 % (ref 19.6–45.3)
MCH RBC QN AUTO: 32.9 PG (ref 26.6–33)
MCHC RBC AUTO-ENTMCNC: 34.2 G/DL (ref 31.5–35.7)
MCV RBC AUTO: 96.1 FL (ref 79–97)
MONOCYTES # BLD AUTO: 0.61 10*3/MM3 (ref 0.1–0.9)
MONOCYTES NFR BLD AUTO: 10.8 % (ref 5–12)
NEUTROPHILS NFR BLD AUTO: 2.62 10*3/MM3 (ref 1.7–7)
NEUTROPHILS NFR BLD AUTO: 46.3 % (ref 42.7–76)
NRBC BLD AUTO-RTO: 0 /100 WBC (ref 0–0.2)
PLATELET # BLD AUTO: 242 10*3/MM3 (ref 140–450)
PMV BLD AUTO: 11.4 FL (ref 6–12)
POTASSIUM SERPL-SCNC: 5.2 MMOL/L (ref 3.5–5.2)
PROT SERPL-MCNC: 7.6 G/DL (ref 6–8.5)
PSA SERPL-MCNC: 1.8 NG/ML (ref 0–4)
RBC # BLD AUTO: 4.56 10*6/MM3 (ref 4.14–5.8)
SODIUM SERPL-SCNC: 141 MMOL/L (ref 136–145)
T4 FREE SERPL-MCNC: 1.35 NG/DL (ref 0.93–1.7)
TRIGL SERPL-MCNC: 40 MG/DL (ref 0–150)
TSH SERPL DL<=0.05 MIU/L-ACNC: 1.36 UIU/ML (ref 0.27–4.2)
VLDLC SERPL-MCNC: 6 MG/DL (ref 5–40)
WBC NRBC COR # BLD: 5.66 10*3/MM3 (ref 3.4–10.8)

## 2022-03-22 PROCEDURE — 83036 HEMOGLOBIN GLYCOSYLATED A1C: CPT

## 2022-03-22 PROCEDURE — 99204 OFFICE O/P NEW MOD 45 MIN: CPT | Performed by: NURSE PRACTITIONER

## 2022-03-22 PROCEDURE — G0103 PSA SCREENING: HCPCS

## 2022-03-22 PROCEDURE — 36415 COLL VENOUS BLD VENIPUNCTURE: CPT

## 2022-03-22 PROCEDURE — 80050 GENERAL HEALTH PANEL: CPT

## 2022-03-22 PROCEDURE — 86803 HEPATITIS C AB TEST: CPT

## 2022-03-22 PROCEDURE — 84439 ASSAY OF FREE THYROXINE: CPT

## 2022-03-22 PROCEDURE — 82306 VITAMIN D 25 HYDROXY: CPT

## 2022-03-22 PROCEDURE — 80061 LIPID PANEL: CPT

## 2022-03-22 RX ORDER — MELOXICAM 15 MG/1
15 TABLET ORAL DAILY PRN
Qty: 30 TABLET | Refills: 1 | Status: SHIPPED | OUTPATIENT
Start: 2022-03-22

## 2022-03-22 RX ORDER — CHOLECALCIFEROL (VITAMIN D3) 1250 MCG
CAPSULE ORAL
COMMUNITY
Start: 2021-12-15 | End: 2022-03-22 | Stop reason: SDUPTHER

## 2022-03-22 RX ORDER — SIMVASTATIN 10 MG
10 TABLET ORAL NIGHTLY
Qty: 90 TABLET | Refills: 3 | Status: SHIPPED | OUTPATIENT
Start: 2022-03-22 | End: 2022-09-15 | Stop reason: SDUPTHER

## 2022-03-22 RX ORDER — OMEPRAZOLE 40 MG/1
40 CAPSULE, DELAYED RELEASE ORAL DAILY
Qty: 30 CAPSULE | Refills: 5 | Status: SHIPPED | OUTPATIENT
Start: 2022-03-22 | End: 2022-08-10 | Stop reason: SDUPTHER

## 2022-03-22 RX ORDER — SIMVASTATIN 10 MG
TABLET ORAL
COMMUNITY
Start: 2021-12-15 | End: 2022-03-22 | Stop reason: SDUPTHER

## 2022-03-22 RX ORDER — CHOLECALCIFEROL (VITAMIN D3) 1250 MCG
50000 CAPSULE ORAL
Qty: 12 CAPSULE | Refills: 3 | Status: SHIPPED | OUTPATIENT
Start: 2022-03-22

## 2022-03-22 RX ORDER — SERTRALINE HYDROCHLORIDE 25 MG/1
25 TABLET, FILM COATED ORAL DAILY
Qty: 30 TABLET | Refills: 3 | Status: SHIPPED | OUTPATIENT
Start: 2022-03-22 | End: 2022-09-15 | Stop reason: SDUPTHER

## 2022-03-22 NOTE — PROGRESS NOTES
Subjective   Mathew Sam is a 46 y.o. male.     CC: Establish care-hyperlipidemia, anxiety, depression, right shoulder pain, left wrist pain, vitamin D deficiency ,GERD  Hyperlipidemia  This is a chronic problem. The current episode started more than 1 year ago. Recent lipid tests were reviewed and are variable. He has no history of obesity. Factors aggravating his hyperlipidemia include fatty foods. Pertinent negatives include no chest pain, focal sensory loss, focal weakness, leg pain, myalgias or shortness of breath. Current antihyperlipidemic treatment includes statins. The current treatment provides significant improvement of lipids. Compliance problems include adherence to exercise and adherence to diet.  Risk factors for coronary artery disease include family history, dyslipidemia, male sex, a sedentary lifestyle and stress.   Anxiety  Presents for initial visit. Onset was more than 5 years ago. The problem has been waxing and waning. Symptoms include depressed mood, excessive worry, irritability, nervous/anxious behavior and restlessness. Patient reports no chest pain, dizziness, nausea, palpitations, shortness of breath or suicidal ideas. Symptoms occur occasionally. The severity of symptoms is moderate. Nothing aggravates the symptoms. The quality of sleep is fair. Nighttime awakenings: occasional.     His past medical history is significant for anxiety/panic attacks and depression. There is no history of suicide attempts. Past treatments include nothing.   Depression  Visit Type: initial  Onset of symptoms: more than 5 years ago  Progression since onset: waxing and waning  Patient presents with the following symptoms: anhedonia, depressed mood, excessive worry, irritability, nervousness/anxiety and restlessness.  Patient is not experiencing: palpitations, shortness of breath, suicidal ideas, suicidal planning, thoughts of death, weight gain and weight loss.  Frequency of symptoms: occasionally    Severity: moderate   Sleep quality: fair  Nighttime awakenings: occasional  Patient has a history of: anxiety/panic attacks and depression  No history of: anemia and suicide attempt  Treatment tried: nothing      Arm Pain   The incident occurred more than 1 week ago. There was no injury mechanism. The pain is present in the right shoulder and left wrist. The quality of the pain is described as aching and shooting (aching right shoulder, numbness and tingling with shooting pain near left wrist and left thumb). The pain radiates to the left hand. The pain is at a severity of 3/10. The pain is mild. The pain has been intermittent since the incident. Associated symptoms include numbness (left wrist and thumb) and tingling. Pertinent negatives include no chest pain or muscle weakness. The symptoms are aggravated by lifting, palpation and movement. He has tried NSAIDs, acetaminophen and rest for the symptoms. The treatment provided no relief.   Heartburn  He reports no abdominal pain, no chest pain, no coughing, no heartburn, no nausea, no sore throat or no wheezing. This is a chronic problem. The current episode started more than 1 year ago. The problem occurs occasionally. The problem has been waxing and waning. The symptoms are aggravated by certain foods and ETOH. Pertinent negatives include no anemia, fatigue, melena, muscle weakness, orthopnea or weight loss. He has tried a PPI for the symptoms. The treatment provided significant relief.        The following portions of the patient's history were reviewed and updated as appropriate: allergies, current medications, past family history, past medical history, past social history, past surgical history and problem list.    Review of Systems   Constitutional: Positive for irritability. Negative for activity change, appetite change, chills, diaphoresis, fatigue, fever, unexpected weight gain and unexpected weight loss.   HENT: Negative for congestion, sore throat, trouble  swallowing and voice change.    Eyes: Negative for blurred vision, double vision, photophobia, pain and visual disturbance.   Respiratory: Negative for cough, chest tightness, shortness of breath and wheezing.    Cardiovascular: Negative for chest pain, palpitations and leg swelling.   Gastrointestinal: Negative for abdominal distention, abdominal pain, anal bleeding, blood in stool, constipation, diarrhea, melena, nausea, vomiting, GERD and indigestion.   Endocrine: Negative for cold intolerance, heat intolerance, polydipsia, polyphagia and polyuria.   Genitourinary: Negative for dysuria, hematuria and urgency.   Musculoskeletal: Negative for back pain, myalgias and muscle weakness.   Skin: Negative for rash.   Allergic/Immunologic: Negative.    Neurological: Positive for tingling and numbness (left wrist and thumb). Negative for dizziness, focal weakness, syncope, weakness, light-headedness and headache.   Hematological: Negative.    Psychiatric/Behavioral: Positive for agitation, depressed mood and stress. Negative for suicidal ideas. The patient is nervous/anxious.        Objective   Physical Exam  Vitals and nursing note reviewed.   Constitutional:       General: He is not in acute distress.     Appearance: Normal appearance. He is well-developed and normal weight. He is not ill-appearing, toxic-appearing or diaphoretic.   HENT:      Head: Normocephalic and atraumatic.      Right Ear: External ear normal.      Left Ear: External ear normal.      Nose: Nose normal.   Eyes:      Conjunctiva/sclera: Conjunctivae normal.      Pupils: Pupils are equal, round, and reactive to light.   Neck:      Thyroid: No thyromegaly.      Trachea: No tracheal deviation.   Cardiovascular:      Rate and Rhythm: Normal rate and regular rhythm.      Heart sounds: Normal heart sounds. No murmur heard.    No friction rub. No gallop.   Pulmonary:      Effort: Pulmonary effort is normal. No respiratory distress.      Breath sounds: Normal  breath sounds. No stridor. No wheezing, rhonchi or rales.   Abdominal:      General: Bowel sounds are normal. There is no distension.      Palpations: Abdomen is soft. There is no mass.      Tenderness: There is no abdominal tenderness. There is no guarding or rebound.      Hernia: No hernia is present.   Musculoskeletal:      Right shoulder: Tenderness and bony tenderness present. Decreased range of motion.      Left elbow: Tenderness present in lateral epicondyle (radiates to wrist and left thumb).      Left hand: Tenderness and bony tenderness present. Normal range of motion.      Cervical back: Normal range of motion and neck supple.   Lymphadenopathy:      Cervical: No cervical adenopathy.   Skin:     General: Skin is warm and dry.      Coloration: Skin is not pale.      Findings: No erythema or rash.   Neurological:      Mental Status: He is alert and oriented to person, place, and time.      Cranial Nerves: No cranial nerve deficit.      Coordination: Coordination normal.   Psychiatric:         Attention and Perception: Attention and perception normal.         Mood and Affect: Mood is anxious.         Speech: Speech normal.         Behavior: Behavior normal. Behavior is cooperative.         Thought Content: Thought content normal. Thought content is not paranoid or delusional. Thought content does not include homicidal or suicidal ideation. Thought content does not include homicidal or suicidal plan.         Cognition and Memory: Cognition and memory normal.         Judgment: Judgment normal.           Assessment/Plan   Diagnoses and all orders for this visit:    1. Encounter to establish care (Primary)    2. Annual physical exam  -     Hepatitis C Antibody; Future  -     CBC & Differential; Future  -     Comprehensive Metabolic Panel; Future  -     Hemoglobin A1c; Future  -     Lipid Panel; Future  -     TSH; Future  -     T4, Free; Future  -     PSA Screen; Future, will call with results.    3. Prostate  cancer screening  -     PSA Screen; Future, will call with results    4. Need for hepatitis C screening test  -     Hepatitis C Antibody; Future, will call with results.    5. Mild episode of recurrent major depressive disorder (HCC)  -     Vitamin D 25 Hydroxy; Future  -     sertraline (Zoloft) 25 MG tablet; Take 1 tablet by mouth Daily.  Dispense: 30 tablet; Refill: 3  -     Ambulatory Referral to Psychiatry   -No suicidal homicidal ideations.  Patient is agreeable to referral to therapy and medication.  Psychiatry referral placed.  Will start trial of Zoloft 25 mg 1 tablet p.o. daily.  Discontinue if symptoms worsen.  Present to the ER for suicidal homicidal ideations.  Follow-up with psychiatry as scheduled.  Continue vitamin D as prescribed.  Will call with vitamin D levels.  We will continue to monitor.  Patient verbalized understanding of instruction.    6. Anxiety  -     sertraline (Zoloft) 25 MG tablet; Take 1 tablet by mouth Daily.  Dispense: 30 tablet; Refill: 3  -     Ambulatory Referral to Psychiatry   -Plan of care stated above #5.    7. Numbness of left thumb   -Further assessment reveals likely nerve entrapment in the left elbow.  Patient believes symptoms started after being handcuffed while being arrested.  We will continue meloxicam and refer to physical therapy for further evaluation and treatment.  We will continue to monitor.    8. Mixed hyperlipidemia  -     We will call with lab results.  Refill, simvastatin (ZOCOR) 10 MG tablet; Take 1 tablet by mouth Every Night.  Dispense: 90 tablet; Refill: 3    9. Vitamin D deficiency  -  Cholecalciferol (Vitamin D3) 1.25 MG (00105 UT) capsule; Take 1 capsule by mouth Every 7 (Seven) Days.  Dispense: 12 capsule; Refill: 3  - Vitamin D 25 Hydroxy; Future, will call with results.  Continue vitamin D as prescribed.    10. Impingement syndrome of right shoulder  -     Ambulatory Referral to Physical Therapy Evaluate and treat  -     meloxicam (Mobic) 15 MG  tablet; Take 1 tablet by mouth Daily As Needed for Mild Pain .  Dispense: 30 tablet; Refill: 1   -Continue meloxicam as prescribed.  Referral to physical therapy.  We will continue to monitor.    11. 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  Dispense: 30 tablet; Refill: 1   -Plan of care stated above #7.  We will continue to monitor.    12. Gastroesophageal reflux disease without esophagitis  -    Refill, omeprazole (priLOSEC) 40 MG capsule; Take 1 capsule by mouth Daily.  Dispense: 30 capsule; Refill: 5    13.  Follow-up in 1 month or sooner for any acute needs.            This document has been electronically signed by CANDY Brar on March 22, 2022 12:52 CDT

## 2022-03-23 NOTE — PROGRESS NOTES
Vitamin D level low.  Restart vitamin D as prescribed.  Lipid levels elevated.  Restart simvastatin as prescribed along with low-fat diet.  Follow-up as scheduled.

## 2022-04-05 ENCOUNTER — APPOINTMENT (OUTPATIENT)
Dept: PHYSICAL THERAPY | Facility: HOSPITAL | Age: 47
End: 2022-04-05

## 2022-09-15 DIAGNOSIS — F41.9 ANXIETY: ICD-10-CM

## 2022-09-15 DIAGNOSIS — F33.0 MILD EPISODE OF RECURRENT MAJOR DEPRESSIVE DISORDER: ICD-10-CM

## 2022-09-15 DIAGNOSIS — E78.2 MIXED HYPERLIPIDEMIA: ICD-10-CM

## 2022-09-15 RX ORDER — SERTRALINE HYDROCHLORIDE 25 MG/1
25 TABLET, FILM COATED ORAL DAILY
Qty: 30 TABLET | Refills: 0 | Status: SHIPPED | OUTPATIENT
Start: 2022-09-15

## 2022-09-15 RX ORDER — SIMVASTATIN 10 MG
10 TABLET ORAL NIGHTLY
Qty: 30 TABLET | Refills: 0 | Status: SHIPPED | OUTPATIENT
Start: 2022-09-15

## 2022-09-15 NOTE — TELEPHONE ENCOUNTER
Incoming Refill Request      Medication requested (name and dose):     Simvastatin  Sertraline  Meloxicam    Pharmacy where request should be sent:    [facility]    Additional details provided by patient:    Best call back number:     [phone]    Does the patient have less than a 3 day supply:  [x] Yes  [] No    Alley Watkins Rep  09/15/22, 11:31 CDT